# Patient Record
Sex: FEMALE | Race: WHITE
[De-identification: names, ages, dates, MRNs, and addresses within clinical notes are randomized per-mention and may not be internally consistent; named-entity substitution may affect disease eponyms.]

---

## 2018-01-23 ENCOUNTER — HOSPITAL ENCOUNTER (OUTPATIENT)
Dept: HOSPITAL 50 - VM.SDS | Age: 68
Discharge: HOME | End: 2018-01-23
Attending: SURGERY
Payer: MEDICARE

## 2018-01-23 DIAGNOSIS — K57.30: ICD-10-CM

## 2018-01-23 DIAGNOSIS — Z79.899: ICD-10-CM

## 2018-01-23 DIAGNOSIS — E66.9: ICD-10-CM

## 2018-01-23 DIAGNOSIS — Z12.11: Primary | ICD-10-CM

## 2018-01-23 DIAGNOSIS — E78.00: ICD-10-CM

## 2018-01-23 DIAGNOSIS — D12.0: ICD-10-CM

## 2018-01-23 PROCEDURE — 45380 COLONOSCOPY AND BIOPSY: CPT

## 2018-01-23 NOTE — OR
PREOPERATIVE DIAGNOSIS:  Screening colonoscopy.

 

POSTOPERATIVE DIAGNOSIS:  Cecal polyp, sigmoid diverticulosis.

 

PROCEDURE PROPOSED:  Total flexible colonoscopy.

 

PROCEDURE DONE:  Total flexible colonoscopy with polypectomy x1.

 

INDICATION:  This is a 67-year-old female who comes in for her 1st colonoscopic

exam for screening purposes.  She denies any family history or symptomatology.

 

TECHNIQUE:  The patient was brought to the endoscopy suite, placed in left

lateral decubitus position.  She was sedated per CRNA with propofol.  The

flexible video colonoscope was then passed transanally and under visualization

advanced to the cecum.  In the cecal area, there was a small flattish polyp that

I was able to remove with 1 bite of the cold biopsy forceps and submitted for

pathologic examination.  The remainder of the ascending, transverse, and

descending colon was unremarkable.  The sigmoid colon revealed moderate

diverticulosis and the rectum was normal.  The scope was then withdrawn.  The

patient tolerated procedure well.

 

IMPRESSION:

1. Cecal polyp removed.

2. Sigmoid diverticulosis.

 

PLAN:  The patient will be sent a letter with pathology report.  I felt that she

should consider colonic surveillance every 5 years hereafter due to the finding

of this polyp.

 

 

SCM:  01/23/2018 08:03:17  MODL:  01/23/2018 08:45:27

Job #:  [id number]

## 2018-02-12 NOTE — LETTER
2018

 

nAthony Day

 

RE:  ANTHONY DAY :  1950

 

Dear Anthony:

 

The polyp removed from your colon was a tubular adenoma, which is considered a

precancerous type polyp.  There were no worrisome findings within this polyp,

but because of this finding, I feel that you should have your colon evaluated

every 5 years hereafter.

 

If you have any further questions regarding this feel free to call.

 

Respectfully,